# Patient Record
Sex: FEMALE | Race: WHITE | URBAN - METROPOLITAN AREA
[De-identification: names, ages, dates, MRNs, and addresses within clinical notes are randomized per-mention and may not be internally consistent; named-entity substitution may affect disease eponyms.]

---

## 2023-06-20 ENCOUNTER — OFFICE VISIT (OUTPATIENT)
Dept: URBAN - METROPOLITAN AREA CLINIC 17 | Facility: CLINIC | Age: 68
End: 2023-06-20
Payer: MEDICARE

## 2023-06-20 DIAGNOSIS — H26.492 OTHER SECONDARY CATARACT OF LEFT EYE: ICD-10-CM

## 2023-06-20 DIAGNOSIS — H43.811 VITREOUS DEGENERATION, RIGHT EYE: ICD-10-CM

## 2023-06-20 DIAGNOSIS — H26.491 OTHER SECONDARY CATARACT OF RIGHT EYE: Primary | ICD-10-CM

## 2023-06-20 DIAGNOSIS — H04.123 DRY EYE SYNDROME OF BILATERAL LACRIMAL GLANDS: ICD-10-CM

## 2023-06-20 PROCEDURE — 92004 COMPRE OPH EXAM NEW PT 1/>: CPT | Performed by: OPTOMETRIST

## 2023-06-20 ASSESSMENT — INTRAOCULAR PRESSURE
OD: 15
OS: 15

## 2023-06-20 ASSESSMENT — VISUAL ACUITY
OD: 20/20
OS: 20/25

## 2023-06-20 NOTE — IMPRESSION/PLAN
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 Plan: Discussed diagnosis with patient. Recommend YAG PCO with cataract surgeon next available.  Discussed treatment options; patient elects to proceed with Yag capsulotomy, right eye then left eye, RL2; patient cleared for surgery in [place]

## 2023-06-20 NOTE — IMPRESSION/PLAN
Impression: Other secondary cataract of right eye: H26.491. Plan: Discussed diagnosis with patient. Recommend YAG PCO with cataract surgeon next available.  Discussed treatment options; patient elects to proceed with Yag capsulotomy, right eye then left eye, RL2; [place]

## 2023-07-17 ENCOUNTER — SURGERY (OUTPATIENT)
Dept: URBAN - METROPOLITAN AREA SURGERY 7 | Facility: SURGERY | Age: 68
End: 2023-07-17
Payer: MEDICARE

## 2023-07-17 PROCEDURE — 66821 AFTER CATARACT LASER SURGERY: CPT | Performed by: OPHTHALMOLOGY

## 2023-07-24 ENCOUNTER — SURGERY (OUTPATIENT)
Dept: URBAN - METROPOLITAN AREA SURGERY 7 | Facility: SURGERY | Age: 68
End: 2023-07-24
Payer: MEDICARE

## 2023-07-24 PROCEDURE — 66821 AFTER CATARACT LASER SURGERY: CPT | Performed by: OPHTHALMOLOGY

## 2024-08-13 ENCOUNTER — OFFICE VISIT (OUTPATIENT)
Dept: URBAN - METROPOLITAN AREA CLINIC 17 | Facility: CLINIC | Age: 69
End: 2024-08-13
Payer: MEDICARE

## 2024-08-13 DIAGNOSIS — H04.123 DRY EYE SYNDROME OF BILATERAL LACRIMAL GLANDS: Primary | ICD-10-CM

## 2024-08-13 DIAGNOSIS — Z96.1 PRESENCE OF INTRAOCULAR LENS: ICD-10-CM

## 2024-08-13 DIAGNOSIS — H43.813 VITREOUS DEGENERATION, BILATERAL: ICD-10-CM

## 2024-08-13 PROCEDURE — 92014 COMPRE OPH EXAM EST PT 1/>: CPT | Performed by: OPTOMETRIST

## 2024-08-13 ASSESSMENT — INTRAOCULAR PRESSURE
OS: 14
OD: 15

## 2024-09-18 ENCOUNTER — OFFICE VISIT (OUTPATIENT)
Dept: URBAN - METROPOLITAN AREA CLINIC 17 | Facility: CLINIC | Age: 69
End: 2024-09-18
Payer: MEDICARE

## 2024-09-18 DIAGNOSIS — H52.4 PRESBYOPIA: ICD-10-CM

## 2024-09-18 DIAGNOSIS — H11.32 SUBCONJUNCTIVAL HEMORRHAGE OF LEFT EYE: Primary | ICD-10-CM

## 2024-09-18 PROCEDURE — 99213 OFFICE O/P EST LOW 20 MIN: CPT | Performed by: OPTOMETRIST

## 2024-09-18 ASSESSMENT — VISUAL ACUITY
OS: 20/20
OD: 20/20

## 2024-09-18 ASSESSMENT — KERATOMETRY
OS: 41.50
OD: 41.38